# Patient Record
Sex: FEMALE
[De-identification: names, ages, dates, MRNs, and addresses within clinical notes are randomized per-mention and may not be internally consistent; named-entity substitution may affect disease eponyms.]

---

## 2018-04-27 ENCOUNTER — HOSPITAL ENCOUNTER (EMERGENCY)
Dept: HOSPITAL 88 - FSED | Age: 63
LOS: 1 days | Discharge: HOME | End: 2018-04-28
Payer: MEDICARE

## 2018-04-27 VITALS — HEIGHT: 59 IN | WEIGHT: 165 LBS | BODY MASS INDEX: 33.26 KG/M2

## 2018-04-27 DIAGNOSIS — I10: ICD-10-CM

## 2018-04-27 DIAGNOSIS — R10.13: ICD-10-CM

## 2018-04-27 DIAGNOSIS — R10.11: Primary | ICD-10-CM

## 2018-04-27 DIAGNOSIS — R11.2: ICD-10-CM

## 2018-04-27 PROCEDURE — 93005 ELECTROCARDIOGRAM TRACING: CPT

## 2018-04-27 PROCEDURE — 71046 X-RAY EXAM CHEST 2 VIEWS: CPT

## 2018-04-27 PROCEDURE — 84484 ASSAY OF TROPONIN QUANT: CPT

## 2018-04-27 PROCEDURE — 82553 CREATINE MB FRACTION: CPT

## 2018-04-27 PROCEDURE — 87400 INFLUENZA A/B EACH AG IA: CPT

## 2018-04-27 PROCEDURE — 99284 EMERGENCY DEPT VISIT MOD MDM: CPT

## 2018-04-27 PROCEDURE — 85025 COMPLETE CBC W/AUTO DIFF WBC: CPT

## 2018-04-27 PROCEDURE — 74174 CTA ABD&PLVS W/CONTRAST: CPT

## 2018-04-27 PROCEDURE — 81003 URINALYSIS AUTO W/O SCOPE: CPT

## 2018-04-27 PROCEDURE — 80053 COMPREHEN METABOLIC PANEL: CPT

## 2018-04-27 NOTE — XMS REPORT
Clinical Summary

 Created on: 2018



Zofia Crenshaw

External Reference #: [id number]

: 1955

Sex: Female



Demographics







 Address  [address] [place] [address]  [address]

 

 Home Phone  [phone]

 

 Preferred Language  English

 

 Marital Status  Unknown

 

 Pentec[employer]al Affiliation  Catholic

 

 Race  White

 

 Ethnic Group  /Latin





Author







 Author  [facility]

 

 Address  Unknown

 

 Phone  Unavailable







Support







 Name  Relationship  Address  Phone

 

 , Jackelyn Crenshaw  [employer]  Unknown  [phone]







Care Team Providers







 Care Team Member Name  Role  Phone

 

  PCP  Unavailable







Allergies

No Known Allergies



Current Medications







      



  Prescription   Sig.   Disp.   Refills   Start   End Date   Status



      Date  

 

      



  ergocalciferol (VITAMIN   Take 50,000 Units by       Active



  D2) 50,000 unit capsule   mouth once a week.     

 

      



  hydroCHLOROthiazide   Take 12.5 mg by mouth       Active



  (MICROZIDE) 12.5 mg   daily.     



  capsule      







Active Problems







 



  Problem   Noted Date

 

 



  Urinary frequency   2017







Social History







    



  Tobacco Use   Types   Packs/Day   Years Used   Date

 

    



  Never Smoker    

 

    



  Smokeless Tobacco: Never   



  Used   









   



  Alcohol Use   Drinks/Week   oz/Week   Comments

 

   



  No   









 



  Sex Assigned at Birth   Date Recorded

 

 



  Not on file 







Last Filed Vital Signs

Not on file



Plan of Treatment





Not on file



Implants







      



  Implanted   Type   Area      Device   Expiration   Model /



      Identifier   Date   Serial /



        Lot

 

      



  Sys Advntg Fit 850-211 - [id number]   Urology   N/A:   [place]    2019   
850-211 /



  Implanted: Qty: 1 on 2017 by    [facility]   [facility]:UROLOGY/GYN     /



  Hudson Pham MD     ECOLOGY     [id number]







Results

Not on fileafter 2017

## 2018-04-28 VITALS — DIASTOLIC BLOOD PRESSURE: 64 MMHG | SYSTOLIC BLOOD PRESSURE: 136 MMHG

## 2018-07-11 ENCOUNTER — HOSPITAL ENCOUNTER (EMERGENCY)
Dept: HOSPITAL 88 - FSED | Age: 63
Discharge: HOME | End: 2018-07-11
Payer: MEDICARE

## 2018-07-11 VITALS — HEIGHT: 59 IN | BODY MASS INDEX: 31.25 KG/M2 | WEIGHT: 155 LBS

## 2018-07-11 DIAGNOSIS — K29.00: ICD-10-CM

## 2018-07-11 DIAGNOSIS — R11.2: ICD-10-CM

## 2018-07-11 DIAGNOSIS — R10.13: Primary | ICD-10-CM

## 2018-07-11 DIAGNOSIS — R19.7: ICD-10-CM

## 2018-07-11 PROCEDURE — 85025 COMPLETE CBC W/AUTO DIFF WBC: CPT

## 2018-07-11 PROCEDURE — 80053 COMPREHEN METABOLIC PANEL: CPT

## 2018-07-11 PROCEDURE — 99284 EMERGENCY DEPT VISIT MOD MDM: CPT

## 2018-07-11 PROCEDURE — 74177 CT ABD & PELVIS W/CONTRAST: CPT

## 2018-07-11 PROCEDURE — 81003 URINALYSIS AUTO W/O SCOPE: CPT
